# Patient Record
Sex: FEMALE | Race: WHITE | Employment: OTHER | URBAN - METROPOLITAN AREA
[De-identification: names, ages, dates, MRNs, and addresses within clinical notes are randomized per-mention and may not be internally consistent; named-entity substitution may affect disease eponyms.]

---
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
## 2025-01-09 ENCOUNTER — VIRTUAL VISIT (OUTPATIENT)
Dept: PSYCHIATRY | Facility: CLINIC | Age: 20
End: 2025-01-09
Payer: COMMERCIAL

## 2025-01-09 VITALS — WEIGHT: 150 LBS | BODY MASS INDEX: 21.47 KG/M2 | HEIGHT: 70 IN

## 2025-01-09 DIAGNOSIS — F33.1 MODERATE EPISODE OF RECURRENT MAJOR DEPRESSIVE DISORDER (H): ICD-10-CM

## 2025-01-09 DIAGNOSIS — F90.0 ADHD (ATTENTION DEFICIT HYPERACTIVITY DISORDER), INATTENTIVE TYPE: Primary | ICD-10-CM

## 2025-01-09 DIAGNOSIS — F40.10 SOCIAL ANXIETY DISORDER: ICD-10-CM

## 2025-01-09 RX ORDER — VENLAFAXINE HYDROCHLORIDE 37.5 MG/1
CAPSULE, EXTENDED RELEASE ORAL
Qty: 67 CAPSULE | Refills: 0 | Status: SHIPPED | OUTPATIENT
Start: 2025-01-09 | End: 2025-02-15

## 2025-01-09 RX ORDER — LISDEXAMFETAMINE DIMESYLATE 50 MG/1
50 CAPSULE ORAL EVERY MORNING
Qty: 30 CAPSULE | Refills: 0 | Status: SHIPPED | OUTPATIENT
Start: 2025-01-09

## 2025-01-09 ASSESSMENT — PATIENT HEALTH QUESTIONNAIRE - PHQ9: SUM OF ALL RESPONSES TO PHQ QUESTIONS 1-9: 14

## 2025-01-09 NOTE — NURSING NOTE
Current patient location: [address] [place] [id number]    Is the patient currently in the state of [place]? YES    Visit mode: VIDEO    If the visit is dropped, the patient can be reconnected by:VIDEO VISIT: Text to cell phone:   Telephone Information:   Mobile [phone]       Will anyone else be joining the visit? NO  (If patient encounters technical issues they should call [phone]    Are changes needed to the allergy or medication list? No    Are refills needed on medications prescribed by this physician? YES    Rooming Documentation:  Questionnaire(s) completed    Reason for visit: RECHECK    Deana LOPEZ

## 2025-01-09 NOTE — PROGRESS NOTES
"Virtual Visit Details    Type of service:  Video Visit     Originating Location (pt. Location): Home    Distant Location (provider location):  Off-site  Platform used for Video Visit: [facility]    PSYCHIATRY CLINIC PROGRESS NOTE    30 minute medication management   IDENTIFICATION: Jerica Fontana is a 19 year old female with previous psychiatric diagnoses of major depressive disorder, without prior episode, current, moderate, social anxiety disorder and ADHD, inattentive type. Pt presents for ongoing psychiatric follow-up and was seen for initial diagnostic evaluation on 2019.    SUBJECTIVE / INTERIM HISTORY     The pt was last seen in clinic 24 at which time no medication changes were made. The patient reports poor medication adherence. Since the last visit, she has been off medications for about 1 week due to forgetting to have prescriptions transferred. She denies discontinuation effects.      SYMPTOMS include a lowering of mood. She reports that she is feeling more down, apathetic toward life, lower motivation and tired. She describes thoughts like \"it'd be cool if I \" but denies plan or intent. She also denies SIB/HI or any other known safety concerns. She continues to struggle with daily tasks, forgetfulness, and focus.     Current Substance Use- denies. Sober support- na     MEDICAL ROS          Reports A comprehensive review of systems was performed and is negative other than noted above.    PAST MEDICATION TRIALS    Concerta up to 72 mg first trialed 20-3/24/21 was effective, but caused dry eyes. Restarted 21, was effective for under a year at 72 mg but was switched to vyvanse on 2022 due to reduced efficacy.  Adderall XR started 3/24/21 and titrated up to 30 mg on 21. Switched back to Concerta on 21 as it was not as effective.  Lexapro started 19 and titrated to 20 mg on 19 and currently moderately effective for anxiety, but minimally effective for " depression.  Wellbutrin  mg started 12/23/20 and currently moderately effective for depression with Lexapro 20 mg, attempted taper July 2023 but restarted due to increase in anxiety  Hydroxyzine 10 mg TID PRN for anxiety started 12/23/19 and currently not using often.  MEDICAL HISTORY      Primary Care Physician: Clinic, [facility] at [address]  [facility] [place] [facility] [id number]     Neurologic Hx:  head injury- none     seizure- none      LOC- none    other- na   Patient Active Problem List   Diagnosis    Distal radius fracture     ALLERGY     No Known Allergies    MEDICATIONS      Current Outpatient Medications   Medication Sig Dispense Refill    buPROPion (WELLBUTRIN XL) 300 MG 24 hr tablet Take 1 tablet (300 mg) by mouth every morning. 30 tablet 1    escitalopram (LEXAPRO) 20 MG tablet Take 1 tablet (20 mg) by mouth daily. 30 tablet 1    estradiol (VIVELLE-DOT) 0.025 MG/24HR bi-weekly patch Place 1 patch onto the skin twice a week      hydrOXYzine (ATARAX) 10 MG tablet Take 1 tablet (10 mg) by mouth 3 times daily as needed for anxiety 90 tablet 0    lisdexamfetamine (VYVANSE) 50 MG capsule Take 1 capsule (50 mg) by mouth every morning. 30 capsule 0    VITAMIN D PO Take by mouth daily       No current facility-administered medications for this visit.       Drug Interaction Check is remarkable for:  Plasma concentrations and pharmacologic effects of Amphetamines may be increased by strong CYP2D6 inhibitors (eg, Strong CYP2D6 Inhibitors).   Amphetamines may enhance the serotonergic effect of Serotonergic Agents (High Risk). This could result in serotonin syndrome.   Delirium may occur when Selective Serotonin Reuptake Inhibitors and Agents with Clinically Relevant Anticholinergic Effects are coadministered.   Has tolerated thus far but pt aware of interactions and side effects to monitor for  VITALS    There were no vitals taken for this visit.  LABS  use PSYCHLAB______        none    MENTAL STATUS EXAM     Alertness: alert  and oriented  Appearance: casually groomed  Behavior/Demeanor: cooperative, with fair  eye contact  Speech: normal  Language: no problems  Psychomotor: restless and fidgety  Mood:  depressed  Affect: blunted; was congruent to mood; was congruent to content  Thought Process/Associations: unremarkable  Thought Content: reports suicidal ideation without plan; without intent [details in Interim History]  Perception: denies auditory hallucinations and visual hallucinations  Insight: fair  Judgment: fair  Cognition: does appear grossly intact; formal cognitive testing was not done     PSYCHOLOGICAL TESTING:     none    ASSESSMENT     Jerica Fontana is a 19 year old female with psychiatric diagnoses of major depressive disorder, recurrent, moderate, social anxiety disorder and ADHD, inattentive type. Patient was engaged and cooperative throughout the visit. She has been off all medication for about a week and has noticed a lowering of mood. However, in hindsight, she feels that her previous medications never really helped mood as much as they could have and she is interested in trying something else today, specifically something other than another selective serotonin reuptake inhibitor. Will start effexor today. Follow-up planned for 1 month. Will also reorder pt's vyvanse. Pt does feel vyvanse is helpful but also notes that this feels like it could be doing more as well. Pt to reach out in about 2 weeks if she wants to try a higher dose of vyvanse for school and will adjust prior to next appointment. Pt will check BP prior to next appointment.        The longitudinal plan of care for major depressive disorder, without prior episode, current, moderate, social anxiety disorder and ADHD, inattentive type  were addressed during this visit. Due to the added complexity in care, I will continue to support Jerica in the subsequent management of this condition(s) and with the  ongoing continuity of care of this condition(s).      6}       TREATMENT RISK STATEMENT:  The risks, benefits, alternatives and potential adverse effects have been explained and are understood by the pt and pt's parent(s)/guardian.  Discussion of specific concerns included- N/A. The  pt and pt's parent(s)/guardian agrees to the treatment plan with the ability to do so. The  pt and pt's parent(s)/guardian knows to call the clinic for any problems or access emergency care if needed. There are no medical considerations relevant to treatment, as noted above. Substance use is not a problem as noted above.      Drug interaction check was done for any med changes and is discussed above.      DIAGNOSES                                                                                                      Encounter Diagnoses   Name Primary?    ADHD (attention deficit hyperactivity disorder), inattentive type Yes    Social anxiety disorder                                  PLAN                                                                                                 Medication Plan:         -- pt has stopped wellbutrin and lexapro        -- start effexor XR 37.5 mg PO Q Day for 1 week then increase to 75 mg daily   Sent       -- continue vyvanse 50 mg PO Q Day   Sent     Labs:  none    Pt monitor [call for probs]: blood pressure     THERAPY: No Change    REFERRALS [CD, medical, other]:  none    :  none    Controlled Substance Contract was not completed    RTC: 4-6 weeks    CRISIS NUMBERS: Provided in AVS upon request of patient/guardian.

## 2025-02-05 DIAGNOSIS — F33.1 MODERATE EPISODE OF RECURRENT MAJOR DEPRESSIVE DISORDER (H): Primary | ICD-10-CM

## 2025-02-05 RX ORDER — VENLAFAXINE HYDROCHLORIDE 75 MG/1
75 CAPSULE, EXTENDED RELEASE ORAL DAILY
Qty: 30 CAPSULE | Refills: 0 | Status: SHIPPED | OUTPATIENT
Start: 2025-02-05

## 2025-02-05 NOTE — TELEPHONE ENCOUNTER
Last seen: 1/09/2025  RTC: 4-6 weeks  Cancel: 0  No-show: 0  Next appt: 2/17/2025    Incoming refill from pharmacy e-prescribe    Medication requested:   Pending Prescriptions:                       Disp   Refills    venlafaxine (EFFEXOR XR) 75 MG 24 hr caps*30 cap*             Sig: TAKE ONE CAPSULE BY MOUTH DAILY(AFTER DONE WITH           37.5MG TAPER)        Last refill (dispense history or ):         From chart note:        -- start effexor XR 37.5 mg PO Q Day for 1 week then increase to 75 mg daily                 Sent       Medication refill approved per refill protocol.

## 2025-02-08 ENCOUNTER — MYC REFILL (OUTPATIENT)
Dept: PSYCHIATRY | Facility: CLINIC | Age: 20
End: 2025-02-08
Payer: COMMERCIAL

## 2025-02-08 DIAGNOSIS — F90.0 ADHD (ATTENTION DEFICIT HYPERACTIVITY DISORDER), INATTENTIVE TYPE: ICD-10-CM

## 2025-02-10 RX ORDER — LISDEXAMFETAMINE DIMESYLATE 50 MG/1
50 CAPSULE ORAL EVERY MORNING
Qty: 30 CAPSULE | Refills: 0 | Status: SHIPPED | OUTPATIENT
Start: 2025-02-10

## 2025-02-10 NOTE — TELEPHONE ENCOUNTER
Last seen: 1/9/2025  RTC: 4-6 weeks  Cancel: 0  No-show: 0  Next appt: 2/17/2025    Incoming refill from patient via [facility]    Medication requested:   Pending Prescriptions:                       Disp   Refills    lisdexamfetamine (VYVANSE) 50 MG capsule  30 cap*0            Sig: Take 1 capsule (50 mg) by mouth every morning.        Last refill (dispense history or ):         From chart note:        -- continue vyvanse 50 mg PO Q Day                 Sent           Medication unable to be refilled by RN due to criteria not met as indicated.                 []Eligibility - not seen in the last year              []Supervision - no future appointment              []Compliance - no shows, cancellations or lapse in therapy              []Verification - order discrepancy              [x]Controlled medication              []Medication not included in policy              []90-day supply request              []Other:

## 2025-02-17 ENCOUNTER — VIRTUAL VISIT (OUTPATIENT)
Dept: PSYCHIATRY | Facility: CLINIC | Age: 20
End: 2025-02-17
Payer: COMMERCIAL

## 2025-02-17 VITALS — BODY MASS INDEX: 21.47 KG/M2 | WEIGHT: 150 LBS | HEIGHT: 70 IN

## 2025-02-17 DIAGNOSIS — F90.0 ADHD (ATTENTION DEFICIT HYPERACTIVITY DISORDER), INATTENTIVE TYPE: ICD-10-CM

## 2025-02-17 DIAGNOSIS — F40.10 SOCIAL ANXIETY DISORDER: ICD-10-CM

## 2025-02-17 DIAGNOSIS — F33.1 MODERATE EPISODE OF RECURRENT MAJOR DEPRESSIVE DISORDER (H): ICD-10-CM

## 2025-02-17 RX ORDER — VENLAFAXINE HYDROCHLORIDE 75 MG/1
75 CAPSULE, EXTENDED RELEASE ORAL DAILY
Qty: 30 CAPSULE | Refills: 0 | Status: SHIPPED | OUTPATIENT
Start: 2025-02-17

## 2025-02-17 RX ORDER — LISDEXAMFETAMINE DIMESYLATE 50 MG/1
50 CAPSULE ORAL EVERY MORNING
Qty: 30 CAPSULE | Refills: 0 | Status: SHIPPED | OUTPATIENT
Start: 2025-03-10

## 2025-02-17 RX ORDER — HYDROXYZINE HYDROCHLORIDE 10 MG/1
10 TABLET, FILM COATED ORAL 3 TIMES DAILY PRN
Qty: 90 TABLET | Refills: 0 | Status: SHIPPED | OUTPATIENT
Start: 2025-02-17

## 2025-02-17 ASSESSMENT — PAIN SCALES - GENERAL: PAINLEVEL_OUTOF10: NO PAIN (0)

## 2025-02-17 NOTE — NURSING NOTE
Current patient location: [address] [place] [id number]    Is the patient currently in the state of [place]? YES    Visit mode:VIDEO    If the visit is dropped, the patient can be reconnected by: VIDEO VISIT: Text to cell phone:   Telephone Information:   Mobile [phone]       Will anyone else be joining the visit? NO  (If patient encounters technical issues they should call [phone])    How would you like to obtain your AVS? MyChart    Are changes needed to the allergy or medication list? Pt stated no changes to allergies and Pt stated no med changes    Are refills needed on medications prescribed by this physician? NO    Rooming Documentation:  Questionnaire(s) completed    Reason for visit: RECHECK     Nery LOPEZ

## 2025-02-17 NOTE — PROGRESS NOTES
"Virtual Visit Details    Type of service:  Video Visit     Originating Location (pt. Location): Home    Distant Location (provider location):  Off-site  Platform used for Video Visit: [facility]  PSYCHIATRY CLINIC PROGRESS NOTE    30 minute medication management   IDENTIFICATION: Jerica Fontana is a 19 year old female with previous psychiatric diagnoses of major depressive disorder, without prior episode, current, moderate, social anxiety disorder and ADHD, inattentive type. Pt presents for ongoing psychiatric follow-up and was seen for initial diagnostic evaluation on 8/22/2019.    SUBJECTIVE / INTERIM HISTORY     The pt was last seen in clinic 1/9/25 at which time pt had stopped all medications and plan was made to restart vyvanse and switch to effexor for antidepressant. The patient reports good medication adherence. Since the last visit, she has taken her medication daily as prescribed. She denies any side effects. However, later reports low appetite. Plan made to add more structure to her day so she does not forget to eat.     SYMPTOMS include some improvement in mood and motivation. She describes her mood as \"generally pretty good\".  However, she does note an increase in anxiety the past two days but in talking more wonders if this is related to low blood sugar due to not eating enough the past two days. She denies panic, SI/HI/SIB or any other known safety concerns.     Current Substance Use- nicotine continues, averaging 1 per day, THC about twice per week, this is less overall, alcohol about twice per week. . Sober support- na     MEDICAL ROS          Reports A comprehensive review of systems was performed and is negative other than noted above..     PAST MEDICATION TRIALS    Concerta up to 72 mg first trialed 7/23/20-3/24/21 was effective, but caused dry eyes. Restarted 5/5/21, was effective for under a year at 72 mg but was switched to vyvanse on 2/7/2022 due to reduced efficacy.  Adderall XR started 3/24/21 " "and titrated up to 30 mg on 4/2/21. Switched back to Concerta on 5/5/21 as it was not as effective.  Lexapro started 8/22/19 and titrated to 20 mg on 12/23/19 and currently moderately effective for anxiety, but minimally effective for depression, stopped by pt in January 2025   Wellbutrin  mg started 12/23/20 and currently moderately effective for depression with Lexapro 20 mg, attempted taper July 2023 but restarted due to increase in anxiety, stopped by pt in January 2025   Hydroxyzine 10 mg TID PRN for anxiety started 12/23/19 and currently not using often.  MEDICAL HISTORY      Primary Care Physician: Clinic, [facility] at [address]  [facility] [id number]     Neurologic Hx:  head injury- none     seizure- none      LOC- none    other- na   Patient Active Problem List   Diagnosis    Distal radius fracture     ALLERGY     No Known Allergies    MEDICATIONS      Current Outpatient Medications   Medication Sig Dispense Refill    hydrOXYzine HCl (ATARAX) 10 MG tablet Take 1 tablet (10 mg) by mouth 3 times daily as needed for anxiety. 90 tablet 0    [START ON 3/10/2025] lisdexamfetamine (VYVANSE) 50 MG capsule Take 1 capsule (50 mg) by mouth every morning. 30 capsule 0    venlafaxine (EFFEXOR XR) 75 MG 24 hr capsule Take 1 capsule (75 mg) by mouth daily. 30 capsule 0    estradiol (VIVELLE-DOT) 0.025 MG/24HR bi-weekly patch Place 1 patch onto the skin twice a week      VITAMIN D PO Take by mouth daily       No current facility-administered medications for this visit.       Drug Interaction Check is remarkable for:  Amphetamines may enhance the serotonergic effect of Serotonergic Agents (High Risk). This could result in serotonin syndrome.   Has tolerated  VITALS    Ht 1.778 m (5' 10\")   Wt 68 kg (150 lb)   BMI 21.52 kg/m    LABS  use PSYCHLAB______       none    MENTAL STATUS EXAM     Alertness: alert  and oriented  Appearance: casually groomed  Behavior/Demeanor: cooperative, " "with fair  eye contact  Speech: normal  Language: no problems  Psychomotor: restless and fidgety  Mood:  \"pretty good actually\"  Affect: blunted; was congruent to mood; was congruent to content  Thought Process/Associations: unremarkable  Thought Content: denies suicidal ideation   Perception: denies auditory hallucinations and visual hallucinations  Insight: fair  Judgment: fair  Cognition: does appear grossly intact; formal cognitive testing was not done    PSYCHOLOGICAL TESTING:     none    ASSESSMENT     Jerica Fontana is a 19 year old female with psychiatric diagnoses of major depressive disorder, recurrent, moderate, social anxiety disorder and ADHD, inattentive type. Patient was engaged and cooperative throughout the visit. She is endorsing some moderated improvement in mood with switch to effexor. Reinforced a routine around eating and adding more protein today. No changes to medication plan at this time. Follow-up planned for 1 month. Pt to reach out sooner with any questions, concerns, or if an earlier appointment is needed.          I was present with the student Francheska Pink, who participated in the service and in the documentation of the note. I have verified the history and personally performed the psychiatric exam and medical decision-making. I agree with the assessment and plan of care as documented in the note.     The longitudinal plan of care for major depressive disorder, without prior episode, current, moderate, social anxiety disorder and ADHD, inattentive type  were addressed during this visit. Due to the added complexity in care, I will continue to support Jerica in the subsequent management of this condition(s) and with the ongoing continuity of care of this condition(s).      6}       TREATMENT RISK STATEMENT:  The risks, benefits, alternatives and potential adverse effects have been explained and are understood by the pt and pt's parent(s)/guardian.  Discussion of specific concerns " included- N/A. The  pt and pt's parent(s)/guardian agrees to the treatment plan with the ability to do so. The  pt and pt's parent(s)/guardian knows to call the clinic for any problems or access emergency care if needed. There are no medical considerations relevant to treatment, as noted above. Substance use is not a problem as noted above.      Drug interaction check was done for any med changes and is discussed above.      DIAGNOSES                                                                                                      Encounter Diagnoses   Name Primary?    Social anxiety disorder     Moderate episode of recurrent major depressive disorder (H)     ADHD (attention deficit hyperactivity disorder), inattentive type                                    PLAN                                                                                                 Medication Plan:         -- continue effexor 75 mg PO Q Day  sent        -- continue vyvanse 50 mg PO Q Day                 Sent       -- continue hydroxyzine 10 mg TID PRN   sent    Labs:  none    Pt monitor [call for probs]: nothing specific needed    THERAPY: re-engage in therapy    REFERRALS [CD, medical, other]:  none    :  none    Controlled Substance Contract was not completed    RTC: 1 month    CRISIS NUMBERS: Provided in AVS upon request of patient/guardian.

## 2025-03-10 ENCOUNTER — VIRTUAL VISIT (OUTPATIENT)
Dept: PSYCHIATRY | Facility: CLINIC | Age: 20
End: 2025-03-10
Payer: COMMERCIAL

## 2025-03-10 DIAGNOSIS — F33.1 MODERATE EPISODE OF RECURRENT MAJOR DEPRESSIVE DISORDER (H): ICD-10-CM

## 2025-03-10 DIAGNOSIS — F40.10 SOCIAL ANXIETY DISORDER: Primary | ICD-10-CM

## 2025-03-10 DIAGNOSIS — F90.0 ADHD (ATTENTION DEFICIT HYPERACTIVITY DISORDER), INATTENTIVE TYPE: ICD-10-CM

## 2025-03-10 RX ORDER — LISDEXAMFETAMINE DIMESYLATE 50 MG/1
50 CAPSULE ORAL EVERY MORNING
Qty: 30 CAPSULE | Refills: 0 | Status: SHIPPED | OUTPATIENT
Start: 2025-04-10

## 2025-03-10 RX ORDER — LISDEXAMFETAMINE DIMESYLATE 50 MG/1
50 CAPSULE ORAL EVERY MORNING
Qty: 30 CAPSULE | Refills: 0 | Status: SHIPPED | OUTPATIENT
Start: 2025-05-10

## 2025-03-10 RX ORDER — VENLAFAXINE HYDROCHLORIDE 75 MG/1
75 CAPSULE, EXTENDED RELEASE ORAL DAILY
Qty: 30 CAPSULE | Refills: 1 | Status: SHIPPED | OUTPATIENT
Start: 2025-03-10

## 2025-03-10 NOTE — PROGRESS NOTES
"Virtual Visit Details    Type of service:  Video Visit     Originating Location (pt. Location): Home    Distant Location (provider location):  Off-site  Platform used for Video Visit: [facility]    PSYCHIATRY CLINIC PROGRESS NOTE    30 minute medication management   IDENTIFICATION: Jerica Fontana is a 19 year old female with previous psychiatric diagnoses of major depressive disorder, without prior episode, current, moderate, social anxiety disorder and ADHD, inattentive type. Pt presents for ongoing psychiatric follow-up and was seen for initial diagnostic evaluation on 8/22/2019.    SUBJECTIVE / INTERIM HISTORY     The pt was last seen in clinic 2/17/25 at which time no medication changes were made but did reinforce eating more regularly throughout the day. The patient reports good medication adherence. Since the last visit, she has taken her medication daily as prescribed. She denies any know side effects of the medication. She has noticed since increasing intake, she is no longer having low blood sugar symptoms. She will likely start therapy soon with a therapist near her school.     SYMPTOMS include ongoing improvement in mood and anxiety. She states that she is feeling \"good\". She denies excessive worry. She has new friends she is spending time with at school. Focus is good and she is keeping up with her coursework. She denies SI/HI/SIB or any other known safety concerns.     Current Substance Use- no change. nicotine continues, averaging 1 cigarette per day, THC about twice per week, this is less overall, alcohol about twice per week. Sober support- na     MEDICAL ROS          Reports A comprehensive review of systems was performed and is negative other than noted above.     PAST MEDICATION TRIALS    Concerta up to 72 mg first trialed 7/23/20-3/24/21 was effective, but caused dry eyes. Restarted 5/5/21, was effective for under a year at 72 mg but was switched to vyvanse on 2/7/2022 due to reduced " efficacy.  Adderall XR started 3/24/21 and titrated up to 30 mg on 4/2/21. Switched back to Concerta on 5/5/21 as it was not as effective.  Lexapro started 8/22/19 and titrated to 20 mg on 12/23/19 and currently moderately effective for anxiety, but minimally effective for depression, stopped by pt in January 2025   Wellbutrin  mg started 12/23/20 and currently moderately effective for depression with Lexapro 20 mg, attempted taper July 2023 but restarted due to increase in anxiety, stopped by pt in January 2025   Hydroxyzine 10 mg TID PRN for anxiety started 12/23/19 and currently not using often.  MEDICAL HISTORY      Primary Care Physician: Clinic, [facility] at [address]  [facility] [address]     Neurologic Hx:  head injury- none     seizure- none      LOC- none    other- na   Patient Active Problem List   Diagnosis    Distal radius fracture     ALLERGY     No Known Allergies    MEDICATIONS      Current Outpatient Medications   Medication Sig Dispense Refill    estradiol (VIVELLE-DOT) 0.025 MG/24HR bi-weekly patch Place 1 patch onto the skin twice a week      hydrOXYzine HCl (ATARAX) 10 MG tablet Take 1 tablet (10 mg) by mouth 3 times daily as needed for anxiety. 90 tablet 0    lisdexamfetamine (VYVANSE) 50 MG capsule Take 1 capsule (50 mg) by mouth every morning. 30 capsule 0    venlafaxine (EFFEXOR XR) 75 MG 24 hr capsule Take 1 capsule (75 mg) by mouth daily. 30 capsule 0    VITAMIN D PO Take by mouth daily       No current facility-administered medications for this visit.       Drug Interaction Check is remarkable for:  Amphetamines may enhance the serotonergic effect of Serotonergic Agents (High Risk). This could result in serotonin syndrome.   Has tolerated  VITALS    There were no vitals taken for this visit.  LABS  use PSYCHLAB______       none    MENTAL STATUS EXAM     Alertness: alert  and oriented  Appearance: casually groomed  Behavior/Demeanor: cooperative,  "with fair  eye contact  Speech: normal  Language: no problems  Psychomotor: restless and fidgety  Mood:  \"good\"  Affect: appropriate; was congruent to mood; was congruent to content  Thought Process/Associations: unremarkable  Thought Content: denies suicidal ideation   Perception: denies auditory hallucinations and visual hallucinations  Insight: fair  Judgment: fair  Cognition: does appear grossly intact; formal cognitive testing was not done    PSYCHOLOGICAL TESTING:     none    ASSESSMENT     Jerica Fontana is a 19 year old female with psychiatric diagnoses of  major depressive disorder, recurrent, moderate, social anxiety disorder and ADHD, inattentive type. Patient was engaged and cooperative throughout the visit. She thinks her mood and focus are good and would like to keep medications the same today. Follow-up planned for 3 months. Pt to reach out sooner with any questions, concerns, or if an earlier appointment is needed.         The longitudinal plan of care for major depressive disorder, without prior episode, current, moderate, social anxiety disorder and ADHD, inattentive type  were addressed during this visit. Due to the added complexity in care, I will continue to support Jerica in the subsequent management of this condition(s) and with the ongoing continuity of care of this condition(s).      6}       TREATMENT RISK STATEMENT:  The risks, benefits, alternatives and potential adverse effects have been explained and are understood by the pt and pt's parent(s)/guardian.  Discussion of specific concerns included- N/A. The  pt and pt's parent(s)/guardian agrees to the treatment plan with the ability to do so. The  pt and pt's parent(s)/guardian knows to call the clinic for any problems or access emergency care if needed. There are no medical considerations relevant to treatment, as noted above. Substance use is not a problem as noted above.      Drug interaction check was done for any med changes and is " discussed above.      DIAGNOSES                                                                                                      Encounter Diagnoses   Name Primary?    Social anxiety disorder Yes    Moderate episode of recurrent major depressive disorder (H)     ADHD (attention deficit hyperactivity disorder), inattentive type                                  PLAN                                                                                                 Medication Plan:         -- continue effexor 75 mg PO Q Day  sent        -- continue vyvanse 50 mg PO Q Day                 Should have march on file still, sent April/may       -- continue hydroxyzine 10 mg TID PRN                 Pt taking sparingly, not needed today    Labs:  none    Pt monitor [call for probs]: nothing specific needed    THERAPY: No Change    REFERRALS [CD, medical, other]:  none    :  none    Controlled Substance Contract was not completed    RTC: 3 months    CRISIS NUMBERS: Provided in AVS upon request of patient/guardian.

## 2025-03-10 NOTE — PATIENT INSTRUCTIONS
**For crisis resources, please see the information at the end of this document**   Patient Education    Thank you for coming to the [facility].     Lab Testing:  If you had lab testing today and your results are reassuring or normal they will be mailed to you or sent through [employer] within 7 days. If the lab tests need quick action we will call you with the results. The phone number we will call with results is # [phone]. If this is not the best number please call our clinic and change the number.     Medication Refills:  If you need any refills please call your pharmacy and they will contact us. Our fax number for refills is [phone].   Three business days of notice are needed for general medication refill requests.   Five business days of notice are needed for controlled substance refill requests.   If you need to change to a different pharmacy, please contact the new pharmacy directly. The new pharmacy will help you get your medications transferred.     Contact Us:  Please call [phone] during business hours (8-5:00 M-F).   If you have medication related questions after clinic hours, or on the weekend, please call [phone].     Financial Assistance [phone]   Medical Records [phone]       MENTAL HEALTH CRISIS RESOURCES:  For a emergency help, please call 911 or go to the nearest Emergency Department.     Emergency Walk-In Options:   EmPATH Unit @ [facility] ([place]): [phone] - Specialized mental health emergency area designed to be calming  [facility] ([place]): [phone]  [facility] ([place]): [phone]  [facility]): [phone]    [facility] Crisis Information:   [place]: [phone]  [place]: [phone]  [place] ([place]) - Adult: [phone]     Child: [phone]  [place] - Adult: [phone]     Child: [phone]  [place]: [phone]  List of all [place]  [facility] resources:   https://[place].gov/dhs/people-we-serve/adults/health-care/mental-health/resources/crisis-contacts.jsp    National Crisis Information:   Crisis Text Line: Text  [place]  to 030120  Suicide & Crisis Lifeline: 988  National Suicide Prevention Lifeline: 0-036-635-TALK (3-903-791-5364)       For online chat options, visit https://suicidepreventionlifeline.org/chat/  Poison Control Center: 2-361-179-1609  Trans Lifeline: 2-875-250-5425 - Hotline for transgender people of all ages  [employer]: 6-173-630-6535 - Hotline for LGBT youth     For Non-Emergency Support:   Fast Tracker: Mental Health & Substance Use Disorder Resources -   https://www.[employer].org/

## 2025-03-16 ENCOUNTER — MYC MEDICAL ADVICE (OUTPATIENT)
Dept: PSYCHIATRY | Facility: CLINIC | Age: 20
End: 2025-03-16
Payer: COMMERCIAL

## 2025-03-16 DIAGNOSIS — F90.0 ADHD (ATTENTION DEFICIT HYPERACTIVITY DISORDER), INATTENTIVE TYPE: ICD-10-CM

## 2025-03-17 RX ORDER — LISDEXAMFETAMINE DIMESYLATE 50 MG/1
50 CAPSULE ORAL EVERY MORNING
Qty: 30 CAPSULE | Refills: 0 | Status: SHIPPED | OUTPATIENT
Start: 2025-03-17

## 2025-03-17 NOTE — TELEPHONE ENCOUNTER
"Pharmacy Medication \"Transfer\" Request    Medication:    Disp Refills Start End MINDY   lisdexamfetamine (VYVANSE) 50 MG capsule 30 capsule 0 3/10/2025 -- No   Sig - Route: Take 1 capsule (50 mg) by mouth every morning. - Oral   Sent to pharmacy as: Lisdexamfetamine Dimesylate 50 MG Oral Capsule (VYVANSE)   Class: E-Prescribe   Earliest Fill Date: 3/7/2025   Order: [id number]   E-Prescribing Status: Receipt confirmed by pharmacy (2/17/2025  4:23 PM CST)       Old Pharmacy:   [facility]ACY #[id number] - [place], [place] - [address] Pharm[facility]:   [address] in [place]     Last Refill:     "

## 2025-03-22 DIAGNOSIS — F40.10 SOCIAL ANXIETY DISORDER: ICD-10-CM

## 2025-03-24 RX ORDER — HYDROXYZINE HYDROCHLORIDE 10 MG/1
10 TABLET, FILM COATED ORAL 3 TIMES DAILY PRN
Qty: 90 TABLET | Refills: 0 | OUTPATIENT
Start: 2025-03-24

## 2025-03-24 NOTE — TELEPHONE ENCOUNTER
Last seen: 3/10/2025  RTC: 3 months  Any patient initiated cancellations or no shows since last visit? No  Next appt: 6/10/2025  Last filled:        Incoming refill from pharmacy via e-prescribe    Is note signed/closed? Yes    Is this a 90 day request for a psych medication? No    From chart note:        -- continue hydroxyzine 10 mg TID PRN                 Pt taking sparingly, not needed today    Medication unable to be refilled by RN due to criteria not met as indicated.                 []Eligibility - not seen in the last year              []Supervision - no future appointment              []Compliance - no shows, cancellations or lapse in therapy              []Verification - order discrepancy              []Controlled medication              []Medication not included in policy              []90-day supply request              [x]Other: Automated request from pharmacy, patient will contact clinic if in need of a refill.

## 2025-03-26 ENCOUNTER — VIRTUAL VISIT (OUTPATIENT)
Dept: PSYCHIATRY | Facility: CLINIC | Age: 20
End: 2025-03-26
Payer: COMMERCIAL

## 2025-03-26 VITALS — BODY MASS INDEX: 21.47 KG/M2 | WEIGHT: 150 LBS | HEIGHT: 70 IN

## 2025-03-26 DIAGNOSIS — F33.1 MODERATE EPISODE OF RECURRENT MAJOR DEPRESSIVE DISORDER (H): Primary | ICD-10-CM

## 2025-03-26 RX ORDER — DULOXETIN HYDROCHLORIDE 20 MG/1
CAPSULE, DELAYED RELEASE ORAL
Qty: 49 CAPSULE | Refills: 0 | Status: SHIPPED | OUTPATIENT
Start: 2025-03-26 | End: 2025-04-23

## 2025-03-26 ASSESSMENT — PAIN SCALES - GENERAL: PAINLEVEL_OUTOF10: NO PAIN (0)

## 2025-03-26 NOTE — PROGRESS NOTES
Virtual Visit Details    Type of service:  Video Visit     Originating Location (pt. Location): Home    Distant Location (provider location):  Off-site  Platform used for Video Visit: [facility]  PSYCHIATRY CLINIC PROGRESS NOTE    30 minute medication management   IDENTIFICATION: Jerica Fontana is a 19 year old female with previous psychiatric diagnoses of major depressive disorder, without prior episode, current, moderate, social anxiety disorder and ADHD, inattentive type. Pt presents for ongoing psychiatric follow-up and was seen for initial diagnostic evaluation on 8/22/2019.    SUBJECTIVE / INTERIM HISTORY     The pt was last seen in clinic 3/1025 at which time no medication changes were made. The patient reports uncertain medication adherence. Since the last visit, she has stopped taking effexor about 1 week ago after realizing that it was causing stomach upset. Since stopping the effexor, this has resolved. She has taken vyvanse most days. She is starting therapy soon.    SYMPTOMS include feeling more emotional since stopping effexor. She is more easily overwhelmed and tearful. She states that she did find effexor helpful for mood but the stomach upset was not tolerable. She denies SI/HI/SIB or any other known safety concerns.    Current Substance Use- THC and nicotine are unchanged from last visit, alcohol use had increased but she has now decided to quit drinking for now as she thinks it was how she was coping without effexor. Sober support- friends/therapy     MEDICAL ROS          Reports A comprehensive review of systems was performed and is negative other than noted above..     PAST MEDICATION TRIALS    Concerta up to 72 mg first trialed 7/23/20-3/24/21 was effective, but caused dry eyes. Restarted 5/5/21, was effective for under a year at 72 mg but was switched to vyvanse on 2/7/2022 due to reduced efficacy.  Adderall XR started 3/24/21 and titrated up to 30 mg on 4/2/21. Switched back to Concerta on  "5/5/21 as it was not as effective.  Lexapro started 8/22/19 and titrated to 20 mg on 12/23/19 and currently moderately effective for anxiety, but minimally effective for depression, stopped by pt in January 2025   Wellbutrin  mg started 12/23/20 and currently moderately effective for depression with Lexapro 20 mg, attempted taper July 2023 but restarted due to increase in anxiety, stopped by pt in January 2025   Hydroxyzine 10 mg TID PRN for anxiety started 12/23/19 and currently not using often.  MEDICAL HISTORY      Primary Care Physician: Clinic, [facility] at [address]  [facility] [address]     Neurologic Hx:  head injury- none     seizure- none      LOC- none    other- na   Patient Active Problem List   Diagnosis    Distal radius fracture     ALLERGY     No Known Allergies    MEDICATIONS      Current Outpatient Medications   Medication Sig Dispense Refill    estradiol (VIVELLE-DOT) 0.025 MG/24HR bi-weekly patch Place 1 patch onto the skin twice a week      hydrOXYzine HCl (ATARAX) 10 MG tablet Take 1 tablet (10 mg) by mouth 3 times daily as needed for anxiety. 90 tablet 0    lisdexamfetamine (VYVANSE) 50 MG capsule Take 1 capsule (50 mg) by mouth every morning. 30 capsule 0    [START ON 4/10/2025] lisdexamfetamine (VYVANSE) 50 MG capsule Take 1 capsule (50 mg) by mouth every morning. 30 capsule 0    [START ON 5/10/2025] lisdexamfetamine (VYVANSE) 50 MG capsule Take 1 capsule (50 mg) by mouth every morning. 30 capsule 0    venlafaxine (EFFEXOR XR) 75 MG 24 hr capsule Take 1 capsule (75 mg) by mouth daily. 30 capsule 1    VITAMIN D PO Take by mouth daily       No current facility-administered medications for this visit.       Drug Interaction Check is remarkable for:  Amphetamines may enhance the serotonergic effect of Serotonergic Agents (High Risk). This could result in serotonin syndrome.   Has tolerated  VITALS    Ht 1.778 m (5' 10\")   Wt 68 kg (150 lb)   BMI 21.52 " "kg/m    LABS  use PSYCHLAB______       none    MENTAL STATUS EXAM     Alertness: alert  and oriented  Appearance: casually groomed  Behavior/Demeanor: cooperative, with fair  eye contact  Speech: normal  Language: no problems  Psychomotor: restless and fidgety  Mood:  \"more emotional\"  Affect: appropriate; was congruent to mood; was congruent to content  Thought Process/Associations: unremarkable  Thought Content: denies suicidal ideation   Perception: denies auditory hallucinations and visual hallucinations  Insight: fair  Judgment: fair  Cognition: does appear grossly intact; formal cognitive testing was not done    PSYCHOLOGICAL TESTING:     none    ASSESSMENT     Jerica Fontana is a 19 year old female with psychiatric diagnoses of major depressive disorder, recurrent, moderate, social anxiety disorder and ADHD, inattentive type. Patient was engaged and cooperative throughout the visit. She did feel effexor was really helpful for mood but she could not tolerate the side effects. Will try cymbalta instead and will titrate more slowly given previous stomach upset. Follow-up planned for 1 month. Jerica to reach out sooner with any questions, concerns, or if an earlier appointment is needed.     The longitudinal plan of care for major depressive disorder, without prior episode, current, moderate, social anxiety disorder and ADHD, inattentive type  were addressed during this visit. Due to the added complexity in care, I will continue to support Jerica in the subsequent management of this condition(s) and with the ongoing continuity of care of this condition(s).      6}       TREATMENT RISK STATEMENT:  The risks, benefits, alternatives and potential adverse effects have been explained and are understood by the pt and pt's parent(s)/guardian.  Discussion of specific concerns included- N/A. The  pt and pt's parent(s)/guardian agrees to the treatment plan with the ability to do so. The  pt and pt's parent(s)/guardian knows " to call the clinic for any problems or access emergency care if needed. There are no medical considerations relevant to treatment, as noted above. Substance use is not a problem as noted above.      Drug interaction check was done for any med changes and is discussed above.      DIAGNOSES                                                                                                    No diagnosis found.                                PLAN                                                                                                 Medication Plan:         -- pt stopped effexor        -- start cymbalta 20 mg PO Q Day for 7 days then increase to 40 mg daily   Sent       -- continue vyvanse 50 mg PO Q Day                 Should have prescriptions on file       -- continue hydroxyzine 10 mg TID PRN                 not requested today    Labs:  none    Pt monitor [call for probs]: nothing specific needed    THERAPY: No Change    REFERRALS [CD, medical, other]:  none    :  none    Controlled Substance Contract was not completed    RTC: 1 month    CRISIS NUMBERS: Provided in AVS upon request of patient/guardian.

## 2025-03-26 NOTE — NURSING NOTE
History     Chief Complaint   Patient presents with     Otalgia     HPI    Tony Sinclair is a 6 year old male who presents with right ear pain and fullness for 2 day(s).   Severity: mild   Additional symptoms include patient was supposed to be finished with keflex yesterday for strep throat treatment, but has two pills left since he missed a couple of doses per mother. Patient was improving with throat symptoms and has no sore throat currently, but still having slight runny nose, congestion and now right sided ear pain that started as an on and off issue and is not persistent.       Problem list, Medication list, Allergies, and Medical/Social/Surgical histories reviewed in [facility] and updated as appropriate.    Allergies:  Allergies   Allergen Reactions     Amoxicillin Rash     Patient treated with Amoxicillin for otitis media.  Reaction to medication noted.        Problem List:    Patient Active Problem List    Diagnosis Date Noted     Poor sleep hygiene 01/18/2016     Priority: Medium     Overweight 02/10/2015     Priority: Medium     Problem list name updated by automated process. Provider to review          Past Medical History:    Past Medical History:   Diagnosis Date     Single liveborn, born before admission to hospital 2013     Tracheomalacia 2013       Past Surgical History:    History reviewed. No pertinent surgical history.    Family History:    Family History   Problem Relation Age of Onset     Hypertension Maternal Grandfather      Breast Cancer Paternal Grandmother        Social History:  Marital Status:  Single [1]  Social History     Tobacco Use     Smoking status: Passive Smoke Exposure - Never Smoker     Smokeless tobacco: Never Used   Substance Use Topics     Alcohol use: None     Drug use: None        Medications:      azithromycin (ZITHROMAX) 200 MG/5ML suspension   cephalexin (KEFLEX) 125 MG/5ML SUSR   cetirizine (CETIRIZINE HCL CHILDRENS) 5 MG/5ML syrup   melatonin 5 MG SL tablet  Current patient location: [address] [place] [id number]    Is the patient currently in the state of [place]? YES    Visit mode: VIDEO    If the visit is dropped, the patient can be reconnected by:VIDEO VISIT: Text to cell phone:   Telephone Information:   Mobile [phone]       Will anyone else be joining the visit? NO  (If patient encounters technical issues they should call [phone]    Are changes needed to the allergy or medication list? No    Are refills needed on medications prescribed by this physician? NO    Rooming Documentation:  Questionnaire(s) completed    Reason for visit: RECHECK    Katharina MARTINEZF         Multiple Vitamin (MULTI VITAMIN PO)   polyethylene glycol (MIRALAX) powder         Review of Systems   Constitutional: Negative for fever.   HENT: Positive for congestion, ear pain and rhinorrhea. Negative for sore throat.    All other systems reviewed and are negative.      Physical Exam   Pulse: 93  Temp: 98.1  F (36.7  C)  Resp: 20  Weight: 33.8 kg (74 lb 9.6 oz)  SpO2: 99 %      Physical Exam     Pulse 93   Temp 98.1  F (36.7  C) (Oral)   Resp 20   Wt 33.8 kg (74 lb 9.6 oz)   SpO2 99%    Right TM is normal: no effusions, no erythema, and normal landmarks, bulging and erythematous     The Right auditory canal is normal and without drainage, edema or erythema  Left TM is normal: no effusions, no erythema, and normal landmarks  The Left auditory canal is normal and without drainage, edema or erythema  Oropharynx exam is normal: no lesions, erythema, adenopathy or exudate.  GENERAL: no acute distress  EYES: EOMI,  PERRL, conjunctiva clear  NECK: supple, non-tender to palpation, no adenopathy noted  RESP: lungs clear to auscultation - no rales, rhonchi or wheezes  SKIN: no suspicious lesions or rashes   CV: regular rates and rhythm, normal    No results found for this or any previous visit (from the past 24 hour(s)).      ED Course        Procedures              Critical Care time:  none               No results found for this or any previous visit (from the past 24 hour(s)).    Medications - No data to display    Assessments & Plan (with Medical Decision Making)     I have reviewed the nursing notes.    I have reviewed the findings, diagnosis, plan and need for follow up with the patient.   6-year-old male presents the urgent care with right ear pain started a couple days ago.  Patient recently was on Keflex for strep throat and has 2 pills left per mom.  Patient still having a slight runny nose and congestion, but no sore throat or fevers.  See exam findings above.  Patient placed on azithromycin 5-day course  for treatment of right otitis media.  Patient follow-up with primary care doctor in 2 weeks for recheck of ears.  Patient return sooner if symptoms worsen or change these were discussed with patient and given on discharge paperwork.  Patient use Tylenol, ibuprofen, nasal saline sprays or warm compress to the ear for symptom medic treatment.  Patient discharged in stable condition.       Medication List      Started    azithromycin 200 MG/5ML suspension  Commonly known as:  ZITHROMAX  Take 7.5 mLs (300 mg) by mouth daily for 1 day, THEN 4 mLs (160 mg) daily for 4 days.  Start taking on:  4/16/2019            Final diagnoses:   Acute suppurative otitis media of right ear without spontaneous rupture of tympanic membrane, recurrence not specified       4/16/2019   [facility] EMERGENCY DEPARTMENT     Rebeca Hoyt PA-C  04/16/19 1920

## 2025-04-09 ENCOUNTER — VIRTUAL VISIT (OUTPATIENT)
Dept: PSYCHIATRY | Facility: CLINIC | Age: 20
End: 2025-04-09
Payer: COMMERCIAL

## 2025-04-09 DIAGNOSIS — F40.10 SOCIAL ANXIETY DISORDER: ICD-10-CM

## 2025-04-09 DIAGNOSIS — F33.1 MODERATE EPISODE OF RECURRENT MAJOR DEPRESSIVE DISORDER (H): Primary | ICD-10-CM

## 2025-04-09 DIAGNOSIS — F90.0 ADHD (ATTENTION DEFICIT HYPERACTIVITY DISORDER), INATTENTIVE TYPE: ICD-10-CM

## 2025-04-09 RX ORDER — PROPRANOLOL HYDROCHLORIDE 10 MG/1
5-10 TABLET ORAL DAILY PRN
Qty: 30 TABLET | Refills: 0 | Status: SHIPPED | OUTPATIENT
Start: 2025-04-09

## 2025-04-09 RX ORDER — VENLAFAXINE HYDROCHLORIDE 37.5 MG/1
CAPSULE, EXTENDED RELEASE ORAL
Qty: 67 CAPSULE | Refills: 0 | Status: SHIPPED | OUTPATIENT
Start: 2025-04-09 | End: 2025-05-16

## 2025-04-09 NOTE — PROGRESS NOTES
"PSYCHIATRY CLINIC PROGRESS NOTE    30 minute medication management   IDENTIFICATION: Jerica Fontana is a 19 year old female with previous psychiatric diagnoses of major depressive disorder, without prior episode, current, moderate, social anxiety disorder and ADHD, inattentive type. Pt presents for ongoing psychiatric follow-up and was seen for initial diagnostic evaluation on 8/22/2019.   SUBJECTIVE / INTERIM HISTORY     The pt was last seen in clinic 3/26/25 at which time plan was made to switch to duloxetine from venlafaxine. The patient reports uncertain medication adherence. Since the last visit, she tried the cymbalta for a few days and stomach upset continued. She then tried without it while at her parents but stomach upset continued which she now thinks is related to anxiety and not her medication. She has started therapy and really likes her new therapist.    SYMPTOMS include continued break through anxiety with stomach upset. Jerica has now realized that the stomach upset is likely more related to anxiety than her medication. As some time has passed, she is now thinking she would prefer to go back to venlafaxine instead of trying to continue to ramp up cymbalta since the venlafaxine was otherwise helpful for mood. She has one specific class that she thinks is causing increased anxiety for which hydroxyzine is not helpful. She is also realizing more that while vvyanse is helping her start her work, she is getting easily distracted or overwhelmed \"fuzzy\" pretty quickly after starting. She denies SI/HI/SIB or any other known safety concerns.    Current Substance Use- no change.. Sober support- na     MEDICAL ROS          Reports A comprehensive review of systems was performed and is negative other than noted above..     PAST MEDICATION TRIALS    Concerta up to 72 mg first trialed 7/23/20-3/24/21 was effective, but caused dry eyes. Restarted 5/5/21, was effective for under a year at 72 mg but was switched to " vyvanse on 2/7/2022 due to reduced efficacy.  Adderall XR started 3/24/21 and titrated up to 30 mg on 4/2/21. Switched back to Concerta on 5/5/21 as it was not as effective.  Lexapro started 8/22/19 and titrated to 20 mg on 12/23/19 and currently moderately effective for anxiety, but minimally effective for depression, stopped by pt in January 2025   Wellbutrin  mg started 12/23/20 and currently moderately effective for depression with Lexapro 20 mg, attempted taper July 2023 but restarted due to increase in anxiety, stopped by pt in January 2025   Hydroxyzine 10 mg TID PRN for anxiety started 12/23/19 and currently not using often.  Effexor  MEDICAL HISTORY      Primary Care Physician: Clinic, [facility] at [address]  [facility] [place] [address]     Neurologic Hx:  head injury- none     seizure- none      LOC- none    other- na   Patient Active Problem List   Diagnosis    Distal radius fracture     ALLERGY     No Known Allergies    MEDICATIONS      Current Outpatient Medications   Medication Sig Dispense Refill    estradiol (VIVELLE-DOT) 0.025 MG/24HR bi-weekly patch Place 1 patch onto the skin twice a week      lisdexamfetamine (VYVANSE) 50 MG capsule Take 1 capsule (50 mg) by mouth every morning. 30 capsule 0    [START ON 4/10/2025] lisdexamfetamine (VYVANSE) 50 MG capsule Take 1 capsule (50 mg) by mouth every morning. 30 capsule 0    [START ON 5/10/2025] lisdexamfetamine (VYVANSE) 50 MG capsule Take 1 capsule (50 mg) by mouth every morning. 30 capsule 0    VITAMIN D PO Take by mouth daily       No current facility-administered medications for this visit.       Drug Interaction Check is remarkable for:  Amphetamines may enhance the serotonergic effect of Serotonergic Agents (High Risk). This could result in serotonin syndrome.   Previously tolerated  VITALS    There were no vitals taken for this visit.  LABS  use PSYCHLAB______       none    MENTAL STATUS EXAM     Alertness:  "alert  and oriented  Appearance: casually groomed  Behavior/Demeanor: cooperative, with fair  eye contact  Speech: normal  Language: no problems  Psychomotor: restless and fidgety  Mood:  \"up and down\"  Affect: appropriate; was congruent to mood; was congruent to content  Thought Process/Associations: unremarkable  Thought Content: denies suicidal ideation   Perception: denies auditory hallucinations and visual hallucinations  Insight: fair  Judgment: fair  Cognition: does appear grossly intact; formal cognitive testing was not done    PSYCHOLOGICAL TESTING:     none    ASSESSMENT     Jerica Fontana is a 19 year old female with psychiatric diagnoses of major depressive disorder, recurrent, moderate, social anxiety disorder and ADHD, inattentive type. Patient was engaged and cooperative throughout the visit. She has realized that stomach upset is linked to anxiety and would like to stop the ramp up of cymbalta and switch back to effexor because it was so helpful for mood. She does not think hydroxyzine is helpful for break through anxiety. She has already stopped taking cymbalta. Plan made to restart effexor 37.5 mg Po Q Day for 7 days then increase to 75 mg daily and switch hydroxyzine to propranolol PRN. Follow-up in 3 weeks. Jerica to reach out sooner with any questions, concerns, or if an earlier appointment is needed.       The longitudinal plan of care for major depressive disorder, without prior episode, current, moderate, social anxiety disorder and ADHD, inattentive type  were addressed during this visit. Due to the added complexity in care, I will continue to support Jerica in the subsequent management of this condition(s) and with the ongoing continuity of care of this condition(s).      6}       TREATMENT RISK STATEMENT:  The risks, benefits, alternatives and potential adverse effects have been explained and are understood by the pt and pt's parent(s)/guardian.  Discussion of specific concerns included- " N/A. The  pt and pt's parent(s)/guardian agrees to the treatment plan with the ability to do so. The  pt and pt's parent(s)/guardian knows to call the clinic for any problems or access emergency care if needed. There are no medical considerations relevant to treatment, as noted above. Substance use is not a problem as noted above.      Drug interaction check was done for any med changes and is discussed above.      DIAGNOSES                                                                                                      Encounter Diagnoses   Name Primary?    Moderate episode of recurrent major depressive disorder (H) Yes    Social anxiety disorder     ADHD (attention deficit hyperactivity disorder), inattentive type                                    PLAN                                                                                                 Medication Plan:         -- pt stopped cymbalta        -- restart effexor XR 37.5 mg Po Q Day for 7 days then increase to 75 mg daily   Sent       -- stop hydroxyzine       -- start propranolol 5-10 mg PO Q Day PRN   Sent       -- continue vyvanse 50 mg PO Q Day                 Should have prescriptions on file    Labs:  none    Pt monitor [call for probs]: nothing specific needed    THERAPY: No Change    REFERRALS [CD, medical, other]:  none    :  none    Controlled Substance Contract was not completed    RTC: 3 weeks    CRISIS NUMBERS: Provided in AVS upon request of patient/guardian.

## 2025-04-23 DIAGNOSIS — F40.10 SOCIAL ANXIETY DISORDER: ICD-10-CM

## 2025-04-23 RX ORDER — ESCITALOPRAM OXALATE 20 MG/1
20 TABLET ORAL DAILY
Qty: 30 TABLET | Refills: 1 | OUTPATIENT
Start: 2025-04-23

## 2025-04-29 ENCOUNTER — VIRTUAL VISIT (OUTPATIENT)
Dept: PSYCHIATRY | Facility: CLINIC | Age: 20
End: 2025-04-29
Payer: COMMERCIAL

## 2025-04-29 DIAGNOSIS — F90.0 ADHD (ATTENTION DEFICIT HYPERACTIVITY DISORDER), INATTENTIVE TYPE: ICD-10-CM

## 2025-04-29 DIAGNOSIS — F33.1 MODERATE EPISODE OF RECURRENT MAJOR DEPRESSIVE DISORDER (H): ICD-10-CM

## 2025-04-29 DIAGNOSIS — F40.10 SOCIAL ANXIETY DISORDER: ICD-10-CM

## 2025-04-29 RX ORDER — VENLAFAXINE HYDROCHLORIDE 75 MG/1
75 CAPSULE, EXTENDED RELEASE ORAL DAILY
Qty: 30 CAPSULE | Refills: 1 | Status: SHIPPED | OUTPATIENT
Start: 2025-04-29

## 2025-04-29 RX ORDER — LISDEXAMFETAMINE DIMESYLATE 60 MG/1
60 CAPSULE ORAL EVERY MORNING
Qty: 30 CAPSULE | Refills: 0 | Status: SHIPPED | OUTPATIENT
Start: 2025-05-29

## 2025-04-29 RX ORDER — LISDEXAMFETAMINE DIMESYLATE 60 MG/1
60 CAPSULE ORAL EVERY MORNING
Qty: 30 CAPSULE | Refills: 0 | Status: SHIPPED | OUTPATIENT
Start: 2025-04-29

## 2025-04-29 ASSESSMENT — PATIENT HEALTH QUESTIONNAIRE - PHQ9: SUM OF ALL RESPONSES TO PHQ QUESTIONS 1-9: 12

## 2025-04-29 NOTE — PROGRESS NOTES
Virtual Visit Details    Type of service:  Video Visit     Originating Location (pt. Location): Home    Distant Location (provider location):  Off-site  Platform used for Video Visit: [facility]  PSYCHIATRY CLINIC PROGRESS NOTE    30 minute medication management   IDENTIFICATION: Jerica Fontana is a 19 year old female with previous psychiatric diagnoses of major depressive disorder, without prior episode, current, moderate, social anxiety disorder and ADHD, inattentive type. Pt presents for ongoing psychiatric follow-up and was seen for initial diagnostic evaluation on 8/22/2019.   SUBJECTIVE / INTERIM HISTORY     The pt was last seen in clinic 4/9/25 at which time plan was made to restart effexor. The patient reports good medication adherence. Since the last visit, she has taken her medication daily as prescribed. She denies any known side effects of the medication.    SYMPTOMS include a recent lowering of mood in which she felt more depressed and unmotivated. She states that she mostly just 'rotted in bed'. She thinks this has improved recently in the past day or two. She is up today and getting ready for class and a therapy appointment. She denies current SI/SIB/HI but does endorse some passive SI earlier, denying plan or intent. Focus continues to be worse than before even when taking vyvanse.    Current Substance Use- no change. Sober support- na     MEDICAL ROS          Reports A comprehensive review of systems was performed and is negative other than noted above..     PAST MEDICATION TRIALS    Concerta up to 72 mg first trialed 7/23/20-3/24/21 was effective, but caused dry eyes. Restarted 5/5/21, was effective for under a year at 72 mg but was switched to vyvanse on 2/7/2022 due to reduced efficacy.  Adderall XR started 3/24/21 and titrated up to 30 mg on 4/2/21. Switched back to Concerta on 5/5/21 as it was not as effective.  Lexapro started 8/22/19 and titrated to 20 mg on 12/23/19 and currently moderately  effective for anxiety, but minimally effective for depression, stopped by pt in January 2025   Wellbutrin  mg started 12/23/20 and currently moderately effective for depression with Lexapro 20 mg, attempted taper July 2023 but restarted due to increase in anxiety, stopped by pt in January 2025   Hydroxyzine 10 mg TID PRN for anxiety started 12/23/19 and currently not using often.  Effexor, retrial 4/9/25 well tolerated so far was previously stopped due to concerns stomach upset but this did not improve without the medication and was ultimately linked to anxiety, was originally helpful at 75 mg   MEDICAL HISTORY      Primary Care Physician: Clinic, [facility] at [address]  [place] [address]     Neurologic Hx:  head injury- none     seizure- none      LOC- none    other- na   Patient Active Problem List   Diagnosis    Distal radius fracture     ALLERGY     No Known Allergies    MEDICATIONS      Current Outpatient Medications   Medication Sig Dispense Refill    [START ON 5/29/2025] lisdexamfetamine (VYVANSE) 60 MG capsule Take 1 capsule (60 mg) by mouth every morning. 30 capsule 0    lisdexamfetamine (VYVANSE) 60 MG capsule Take 1 capsule (60 mg) by mouth every morning. 30 capsule 0    venlafaxine (EFFEXOR XR) 75 MG 24 hr capsule Take 1 capsule (75 mg) by mouth daily. 30 capsule 1    estradiol (VIVELLE-DOT) 0.025 MG/24HR bi-weekly patch Place 1 patch onto the skin twice a week      propranolol (INDERAL) 10 MG tablet Take 0.5-1 tablets (5-10 mg) by mouth daily as needed (anxiety). 30 tablet 0    VITAMIN D PO Take by mouth daily       No current facility-administered medications for this visit.       Drug Interaction Check is remarkable for:  Amphetamines may enhance the serotonergic effect of Serotonergic Agents (High Risk). This could result in serotonin syndrome.   Previously tolerated  VITALS    There were no vitals taken for this visit.  LABS  use PSYCHLAB______    "    none    MENTAL STATUS EXAM     Alertness: alert  and oriented  Appearance: casually groomed  Behavior/Demeanor: cooperative, with fair  eye contact  Speech: normal  Language: no problems  Psychomotor: restless and fidgety  Mood:  \"up and down\"  Affect: appropriate; was congruent to mood; was congruent to content  Thought Process/Associations: unremarkable  Thought Content: denies suicidal ideation   Perception: denies auditory hallucinations and visual hallucinations  Insight: fair  Judgment: fair  Cognition: does appear grossly intact; formal cognitive testing was not done    PSYCHOLOGICAL TESTING:     none    ASSESSMENT     Jerica Fontana is a 19 year old female with psychiatric diagnoses of major depressive disorder, recurrent, moderate, social anxiety disorder and ADHD, inattentive type. Patient was engaged and cooperative throughout the visit. She has tolerated the retrial of effexor well. No significant improvement in mood yet but she has only been at the 75 mg dose for about 2 weeks. She continues to struggle with focus. Plan made to increase vyvanse to 60 mg and continue effexor 75 mg until next appointment in 2 weeks. [place] to reach out sooner with any questions, concerns, or if an earlier appointment is needed.       The longitudinal plan of care for major depressive disorder, without prior episode, current, moderate, social anxiety disorder and ADHD, inattentive type  were addressed during this visit. Due to the added complexity in care, I will continue to support Jerica in the subsequent management of this condition(s) and with the ongoing continuity of care of this condition(s).      6}       TREATMENT RISK STATEMENT:  The risks, benefits, alternatives and potential adverse effects have been explained and are understood by the pt and pt's parent(s)/guardian.  Discussion of specific concerns included- N/A. The  pt and pt's parent(s)/guardian agrees to the treatment plan with the ability to do so. The "  pt and pt's parent(s)/guardian knows to call the clinic for any problems or access emergency care if needed. There are no medical considerations relevant to treatment, as noted above. Substance use is not a problem as noted above.      Drug interaction check was done for any med changes and is discussed above.      DIAGNOSES                                                                                                      Encounter Diagnoses   Name Primary?    ADHD (attention deficit hyperactivity disorder), inattentive type     Moderate episode of recurrent major depressive disorder (H)     Social anxiety disorder                                    PLAN                                                                                                 Medication Plan:         -- increase vyvanse to 60 mg PO Q Day  sent        -- continue effexor XR 75 mg Po Q Day   Sent       -- continue propranolol 5-10 mg PO Q Day PRN                 Not requested today    Labs:  none    Pt monitor [call for probs]: nothing specific needed    THERAPY: No Change    REFERRALS [CD, medical, other]:  none    :  none    Controlled Substance Contract was not completed    RTC: 2 weeks    CRISIS NUMBERS: Provided in AVS upon request of patient/guardian.

## 2025-05-29 ENCOUNTER — VIRTUAL VISIT (OUTPATIENT)
Dept: PSYCHIATRY | Facility: CLINIC | Age: 20
End: 2025-05-29
Payer: COMMERCIAL

## 2025-05-29 DIAGNOSIS — F40.10 SOCIAL ANXIETY DISORDER: ICD-10-CM

## 2025-05-29 DIAGNOSIS — F33.1 MODERATE EPISODE OF RECURRENT MAJOR DEPRESSIVE DISORDER (H): ICD-10-CM

## 2025-05-29 DIAGNOSIS — F90.0 ADHD (ATTENTION DEFICIT HYPERACTIVITY DISORDER), INATTENTIVE TYPE: ICD-10-CM

## 2025-05-29 RX ORDER — VENLAFAXINE HYDROCHLORIDE 150 MG/1
150 CAPSULE, EXTENDED RELEASE ORAL DAILY
Qty: 30 CAPSULE | Refills: 1 | Status: SHIPPED | OUTPATIENT
Start: 2025-05-29

## 2025-05-29 RX ORDER — LISDEXAMFETAMINE DIMESYLATE 60 MG/1
60 CAPSULE ORAL EVERY MORNING
Qty: 30 CAPSULE | Refills: 0 | Status: SHIPPED | OUTPATIENT
Start: 2025-06-03

## 2025-05-29 RX ORDER — PROPRANOLOL HYDROCHLORIDE 10 MG/1
TABLET ORAL
Qty: 90 TABLET | OUTPATIENT
Start: 2025-05-29

## 2025-05-29 RX ORDER — VENLAFAXINE HYDROCHLORIDE 75 MG/1
75 CAPSULE, EXTENDED RELEASE ORAL DAILY
Qty: 90 CAPSULE | Refills: 1 | OUTPATIENT
Start: 2025-05-29

## 2025-05-29 RX ORDER — PROPRANOLOL HYDROCHLORIDE 10 MG/1
5-10 TABLET ORAL DAILY PRN
Qty: 30 TABLET | Refills: 0 | Status: SHIPPED | OUTPATIENT
Start: 2025-05-29

## 2025-05-29 ASSESSMENT — PAIN SCALES - GENERAL: PAINLEVEL_OUTOF10: NO PAIN (0)

## 2025-05-29 NOTE — NURSING NOTE
Current patient location: [address] [place] [id number]    Is the patient currently in the state of [place]? YES    Visit mode: VIDEO    If the visit is dropped, the patient can be reconnected by:VIDEO VISIT: Text to cell phone:   Telephone Information:   Mobile [phone]       Will anyone else be joining the visit? NO  (If patient encounters technical issues they should call [phone]    Are changes needed to the allergy or medication list? No    Are refills needed on medications prescribed by this physician? NO    Rooming Documentation:  Questionnaire(s) completed    Reason for visit: RECHECK    Aylin LOPEZ

## 2025-05-29 NOTE — TELEPHONE ENCOUNTER
Request for 90 day supply DENIED. Not required by insurance and patient has new prescription available.

## 2025-05-29 NOTE — TELEPHONE ENCOUNTER
Request for 90 day supply DENIED. Patient has medication available and has an appointment with provider today.

## 2025-05-29 NOTE — PROGRESS NOTES
Virtual Visit Details    Type of service:  Video Visit     Originating Location (pt. Location): Home    Distant Location (provider location):  Off-site  Platform used for Video Visit: [facility]  PSYCHIATRY CLINIC PROGRESS NOTE    30 minute medication management   IDENTIFICATION: Jerica Fontana is a 20 year old female with previous psychiatric diagnoses of major depressive disorder, without prior episode, current, moderate, social anxiety disorder and ADHD, inattentive type. Pt presents for ongoing psychiatric follow-up and was seen for initial diagnostic evaluation on 8/22/2019.   SUBJECTIVE / INTERIM HISTORY     The pt was last seen in clinic 4/29/25 at which time vyvanse was increased to 60 mg daily. The patient reports good medication adherence. Since the last visit, she has taken her medication most days as prescribed. She does notice that sleep initiation is a little worse and thinks maybe vyvanse is contributing.    SYMPTOMS include a recent lowering of mood that then resulted in not getting her work done, failing classes, and moving back home. She states that this has been going on for a while and her Mom now wants her to do an IOP or PHP program. She thinks social anxiety is ramped up significantly as well and she is feeling pretty down about not finishing her classes and what people might think of her. Sleep is disrupted and she is falling asleep later then sleeping most of the day. She denies current SI/HI/SIB or any other known safety concerns.     Current Substance Use- no change. Sober support- na     MEDICAL ROS          Reports A comprehensive review of systems was performed and is negative other than noted above.    PAST MEDICATION TRIALS    Concerta up to 72 mg first trialed 7/23/20-3/24/21 was effective, but caused dry eyes. Restarted 5/5/21, was effective for under a year at 72 mg but was switched to vyvanse on 2/7/2022 due to reduced efficacy.  Adderall XR started 3/24/21 and titrated up to 30 mg  on 4/2/21. Switched back to Concerta on 5/5/21 as it was not as effective.  Lexapro started 8/22/19 and titrated to 20 mg on 12/23/19 and currently moderately effective for anxiety, but minimally effective for depression, stopped by pt in January 2025   Wellbutrin  mg started 12/23/20 and currently moderately effective for depression with Lexapro 20 mg, attempted taper July 2023 but restarted due to increase in anxiety, stopped by pt in January 2025   Hydroxyzine 10 mg TID PRN for anxiety started 12/23/19 and currently not using often.  Effexor, retrial 4/9/25 well tolerated so far was previously stopped due to concerns stomach upset but this did not improve without the medication and was ultimately linked to anxiety, was originally helpful at 75 mg   MEDICAL HISTORY      Primary Care Physician: Clinic, [facility] at [address]  [facility] [place] [facility] [address]     Neurologic Hx:  head injury- none     seizure- none      LOC- none    other- na   Patient Active Problem List   Diagnosis    Distal radius fracture     ALLERGY     No Known Allergies    MEDICATIONS      Current Outpatient Medications   Medication Sig Dispense Refill    estradiol (VIVELLE-DOT) 0.025 MG/24HR bi-weekly patch Place 1 patch onto the skin twice a week      [START ON 6/3/2025] lisdexamfetamine (VYVANSE) 60 MG capsule Take 1 capsule (60 mg) by mouth every morning. 30 capsule 0    lisdexamfetamine (VYVANSE) 60 MG capsule Take 1 capsule (60 mg) by mouth every morning. 30 capsule 0    propranolol (INDERAL) 10 MG tablet Take 0.5-1 tablets (5-10 mg) by mouth daily as needed (anxiety). 30 tablet 0    venlafaxine (EFFEXOR XR) 150 MG 24 hr capsule Take 1 capsule (150 mg) by mouth daily. 30 capsule 1    VITAMIN D PO Take by mouth daily       No current facility-administered medications for this visit.       Drug Interaction Check is remarkable for:  Amphetamines may enhance the serotonergic effect of Serotonergic Agents (High  Risk). This could result in serotonin syndrome.   Previously tolerated  VITALS    There were no vitals taken for this visit.  LABS  use PSYCHLAB______       none    MENTAL STATUS EXAM     Alertness: alert  and oriented  Appearance: casually groomed  Behavior/Demeanor: cooperative, with fair  eye contact  Speech: normal  Language: no problems  Psychomotor: restless and fidgety  Mood:  depressed  Affect: appropriate; was congruent to mood; was congruent to content  Thought Process/Associations: unremarkable  Thought Content: denies suicidal ideation   Perception: denies auditory hallucinations and visual hallucinations  Insight: fair  Judgment: fair  Cognition: does appear grossly intact; formal cognitive testing was not done    PSYCHOLOGICAL TESTING:     none    ASSESSMENT     Jerica Fontana is a 20 year old female with psychiatric diagnoses of major depressive disorder, recurrent, moderate, social anxiety disorder and ADHD, inattentive type. Patient was engaged and cooperative throughout the visit though tearful at times. She thinks she has become progressively more depressed which resulted in failing school. She has moved back home and feels an increased level of care is needed. Mom was present at the end of the appointment as well and will help Jerica coordinate care. Plan made to increase effexor today and will place referrals for IOP/PHP. Follow-up with me planned for 1 month if Jerica is not already enrolled in programming.         The longitudinal plan of care for major depressive disorder, without prior episode, current, moderate, social anxiety disorder and ADHD, inattentive type  were addressed during this visit. Due to the added complexity in care, I will continue to support Jerica in the subsequent management of this condition(s) and with the ongoing continuity of care of this condition(s).      6}       TREATMENT RISK STATEMENT:  The risks, benefits, alternatives and potential adverse effects have been  explained and are understood by the pt and pt's parent(s)/guardian.  Discussion of specific concerns included- N/A. The  pt and pt's parent(s)/guardian agrees to the treatment plan with the ability to do so. The  pt and pt's parent(s)/guardian knows to call the clinic for any problems or access emergency care if needed. There are no medical considerations relevant to treatment, as noted above. Substance use is not a problem as noted above.      Drug interaction check was done for any med changes and is discussed above.      DIAGNOSES                                                                                                      Encounter Diagnoses   Name Primary?    Moderate episode of recurrent major depressive disorder (H)     Social anxiety disorder     ADHD (attention deficit hyperactivity disorder), inattentive type                                    PLAN                                                                                                 Medication Plan:         -- increase effexor XR to 150 mg PO Q Day  sent        -- continue vyvanse 60 mg PO Q Day   Sent       -- continue propranolol 5-10 mg PO Q Day PRN    sent    Labs:  none    Pt monitor [call for probs]: nothing specific needed    THERAPY: re-engage    REFERRALS [CD, medical, other]:  PHP    :  none    Controlled Substance Contract was not completed    RTC: 1 month if not in PHP    CRISIS NUMBERS: Provided in AVS upon request of patient/guardian.

## 2025-06-29 NOTE — PROGRESS NOTES
"Virtual Visit Details    Type of service:  Video Visit     Originating Location (pt. Location): Home    Distant Location (provider location):  Off-site  Platform used for Video Visit: [facility]      PSYCHIATRY CLINIC PROGRESS NOTE    30 minute medication management   IDENTIFICATION: Jerica Fontana is a 20 year old female with previous psychiatric diagnoses of major depressive disorder, without prior episode, current, moderate, social anxiety disorder and ADHD, inattentive type. Pt presents for ongoing psychiatric follow-up and was seen for initial diagnostic evaluation on 8/22/2019.   SUBJECTIVE / INTERIM HISTORY     The pt was last seen in clinic 5/29/2025 at which time effexor XR was increased to 150 mg daily. The patient reports good medication adherence. Since the last visit, she has taken her medication as prescribed and has not had any side effects from the increase. She is trying to get re-established with therapy but has not received a call back.     SYMPTOMS include some ups and down in mood and energy. She continues to feel anxious when facing social situations. She has been working hard on \"self development\" and trying to understand how to manage her emotions and why she feels the way she does. This has been rewarding but exhausting and she is struggling to do this work on her own. She denies current SI/HI/SIB or any other known safety concerns. She is working on quitting nicotine and it is going fairly well.   Current Substance Use- reduced nicotine use. Sober support- na     MEDICAL ROS          Reports A comprehensive review of systems was performed and is negative other than noted above.    PAST MEDICATION TRIALS    Concerta up to 72 mg first trialed 7/23/20-3/24/21 was effective, but caused dry eyes. Restarted 5/5/21, was effective for under a year at 72 mg but was switched to vyvanse on 2/7/2022 due to reduced efficacy.  Adderall XR started 3/24/21 and titrated up to 30 mg on 4/2/21. Switched back to " Concerta on 5/5/21 as it was not as effective.  Lexapro started 8/22/19 and titrated to 20 mg on 12/23/19 and currently moderately effective for anxiety, but minimally effective for depression, stopped by pt in January 2025   Wellbutrin  mg started 12/23/20 and currently moderately effective for depression with Lexapro 20 mg, attempted taper July 2023 but restarted due to increase in anxiety, stopped by pt in January 2025   Hydroxyzine 10 mg TID PRN for anxiety started 12/23/19 and currently not using often.  Effexor, retrial 4/9/25 well tolerated so far was previously stopped due to concerns stomach upset but this did not improve without the medication and was ultimately linked to anxiety, was originally helpful at 75 mg  MEDICAL HISTORY      Primary Care Physician: Clinic, [facility] at [address]  [facility] [address]     Neurologic Hx:  head injury- none     seizure- none      LOC- none    other- na   Patient Active Problem List   Diagnosis    Distal radius fracture     ALLERGY     No Known Allergies    MEDICATIONS      Current Outpatient Medications   Medication Sig Dispense Refill    estradiol (VIVELLE-DOT) 0.025 MG/24HR bi-weekly patch Place 1 patch onto the skin twice a week      lisdexamfetamine (VYVANSE) 60 MG capsule Take 1 capsule (60 mg) by mouth every morning. 30 capsule 0    lisdexamfetamine (VYVANSE) 60 MG capsule Take 1 capsule (60 mg) by mouth every morning. 30 capsule 0    propranolol (INDERAL) 10 MG tablet Take 0.5-1 tablets (5-10 mg) by mouth daily as needed (anxiety). 30 tablet 0    venlafaxine (EFFEXOR XR) 150 MG 24 hr capsule Take 1 capsule (150 mg) by mouth daily. 30 capsule 1    VITAMIN D PO Take by mouth daily       No current facility-administered medications for this visit.       Drug Interaction Check is remarkable for:  Amphetamines may enhance the serotonergic effect of Serotonergic Agents (High Risk). This could result in serotonin syndrome.  "  Previously tolerated  VITALS    There were no vitals taken for this visit.  LABS  use PSYCHLAB______       none    MENTAL STATUS EXAM     Alertness: alert  and oriented  Appearance: casually groomed  Behavior/Demeanor: cooperative, with fair  eye contact  Speech: normal  Language: no problems  Psychomotor: restless and fidgety  Mood:  \"back and forth; john\"  Affect: appropriate; was congruent to mood; was congruent to content  Thought Process/Associations: unremarkable  Thought Content: denies suicidal ideation   Perception: denies auditory hallucinations and visual hallucinations  Insight: fair  Judgment: fair  Cognition: does appear grossly intact; formal cognitive testing was not done    PSYCHOLOGICAL TESTING:     none    ASSESSMENT     Jerica Fontana is a 20 year old female with psychiatric diagnoses of major depressive disorder, recurrent, moderate, social anxiety disorder and ADHD, inattentive type. Patient was engaged and cooperative throughout the visit. She has been doing a lot of self-work on identifying and regulating emotions but is feeling exhausted from this. She has had some mood swings and continues to feel anxious. She will start taking propanolol prior to situations she knows will make her anxious. She would also like to try another increase of effexor. Plan to increase effexor to 225 mg daily and Jerica to check blood pressure at home. New therapy referrals placed to [facility]. Follow up planned for 1 month. Jerica to reach out sooner with any questions, concerns, or if an earlier appointment is needed.          I was present with the student Francheska Pink, who participated in the service and in the documentation of the note. I have verified the history and personally performed the psychiatric exam and medical decision-making. I agree with the assessment and plan of care as documented in the note.     The longitudinal plan of care for major depressive disorder, without prior episode, current, " moderate, social anxiety disorder and ADHD, inattentive type were addressed during this visit. Due to the added complexity in care, I will continue to support Jerica in the subsequent management of this condition(s) and with the ongoing continuity of care of this condition(s).      6}       TREATMENT RISK STATEMENT:  The risks, benefits, alternatives and potential adverse effects have been explained and are understood by the pt and pt's parent(s)/guardian.  Discussion of specific concerns included- N/A. The  pt and pt's parent(s)/guardian agrees to the treatment plan with the ability to do so. The  pt and pt's parent(s)/guardian knows to call the clinic for any problems or access emergency care if needed. There are no medical considerations relevant to treatment, as noted above. Substance use is not a problem as noted above.      Drug interaction check was done for any med changes and is discussed above.      DIAGNOSES                                                                                                      Encounter Diagnoses   Name Primary?    Moderate episode of recurrent major depressive disorder (H) Yes    ADHD (attention deficit hyperactivity disorder), inattentive type     Social anxiety disorder                                  PLAN                                                                                                 Medication Plan:         -- increase effexor XR to 225 mg PO Q Day  sent        -- continue vyvanse 60 mg PO Q Day                 Sent       -- continue propranolol 5-10 mg PO Q Day PRN                  Not sent, pt to message if she needs a refill    Labs:  none    Pt monitor [call for probs]: to get home blood pressure     THERAPY: awaiting call back from previous referral    REFERRALS [CD, medical, other]:  [facility] therapy referral placed    :  none    Controlled Substance Contract was not completed    RTC: 1 month    CRISIS NUMBERS: Provided in AVS upon  request of patient/guardian.

## 2025-06-30 ENCOUNTER — VIRTUAL VISIT (OUTPATIENT)
Dept: PSYCHIATRY | Facility: CLINIC | Age: 20
End: 2025-06-30
Payer: COMMERCIAL

## 2025-06-30 DIAGNOSIS — F40.10 SOCIAL ANXIETY DISORDER: ICD-10-CM

## 2025-06-30 DIAGNOSIS — F33.1 MODERATE EPISODE OF RECURRENT MAJOR DEPRESSIVE DISORDER (H): Primary | ICD-10-CM

## 2025-06-30 DIAGNOSIS — F90.0 ADHD (ATTENTION DEFICIT HYPERACTIVITY DISORDER), INATTENTIVE TYPE: ICD-10-CM

## 2025-06-30 PROCEDURE — 1126F AMNT PAIN NOTED NONE PRSNT: CPT | Mod: 95 | Performed by: NURSE PRACTITIONER

## 2025-06-30 PROCEDURE — 98006 SYNCH AUDIO-VIDEO EST MOD 30: CPT | Performed by: NURSE PRACTITIONER

## 2025-06-30 PROCEDURE — G2211 COMPLEX E/M VISIT ADD ON: HCPCS | Mod: 95 | Performed by: NURSE PRACTITIONER

## 2025-06-30 RX ORDER — LISDEXAMFETAMINE DIMESYLATE 60 MG/1
60 CAPSULE ORAL EVERY MORNING
Qty: 30 CAPSULE | Refills: 0 | Status: SHIPPED | OUTPATIENT
Start: 2025-06-30

## 2025-06-30 RX ORDER — VENLAFAXINE HYDROCHLORIDE 75 MG/1
75 CAPSULE, EXTENDED RELEASE ORAL DAILY
Qty: 30 CAPSULE | Refills: 1 | Status: SHIPPED | OUTPATIENT
Start: 2025-06-30

## 2025-06-30 RX ORDER — LISDEXAMFETAMINE DIMESYLATE 60 MG/1
60 CAPSULE ORAL EVERY MORNING
Qty: 30 CAPSULE | Refills: 0 | Status: SHIPPED | OUTPATIENT
Start: 2025-07-30

## 2025-06-30 RX ORDER — VENLAFAXINE HYDROCHLORIDE 150 MG/1
150 CAPSULE, EXTENDED RELEASE ORAL DAILY
Qty: 30 CAPSULE | Refills: 1 | Status: SHIPPED | OUTPATIENT
Start: 2025-06-30

## 2025-06-30 ASSESSMENT — PAIN SCALES - GENERAL: PAINLEVEL_OUTOF10: NO PAIN (0)

## 2025-06-30 NOTE — NURSING NOTE
Current patient location: [address] [place] [id number]    Is the patient currently in the state of [place]? YES    Visit mode: VIDEO    If the visit is dropped, the patient can be reconnected by:VIDEO VISIT: Text to cell phone:   Telephone Information:   Mobile [phone]       Will anyone else be joining the visit? NO  (If patient encounters technical issues they should call [phone]    Are changes needed to the allergy or medication list? Yes please remove duplicate from med list:  -Lisdexamfetamine (VYVANSE) 60 MG capsule   Are refills needed on medications prescribed by this physician? NO    Rooming Documentation:  Questionnaire(s) not pre-assigned    Reason for visit: CHRISS LOPEZ

## 2025-07-03 DIAGNOSIS — F40.10 SOCIAL ANXIETY DISORDER: ICD-10-CM

## 2025-07-03 RX ORDER — PROPRANOLOL HYDROCHLORIDE 10 MG/1
TABLET ORAL
Qty: 90 TABLET | OUTPATIENT
Start: 2025-07-03

## 2025-07-03 RX ORDER — PROPRANOLOL HYDROCHLORIDE 10 MG/1
5-10 TABLET ORAL DAILY PRN
Qty: 30 TABLET | Refills: 1 | Status: SHIPPED | OUTPATIENT
Start: 2025-07-03

## 2025-07-03 NOTE — TELEPHONE ENCOUNTER
Last seen: 6/30  RTC: 1 month   Cancel: none  No-show: none  Next appointment: 8/7    propranolol (INDERAL) 10 MG tablet 30 tablet 0 5/29/2025 -- No   Sig - Route: Take 0.5-1 tablets (5-10 mg) by mouth daily as needed (anxiety). - Oral   Last refilled: 5/29 for 30 d/s     Per most recent visit note:       -- continue propranolol 5-10 mg PO Q Day PRN                  Not sent, pt to message if she needs a refill    Medication refill approved per refill protocol  
Adequate: hears normal conversation without difficulty

## 2025-07-12 ENCOUNTER — HEALTH MAINTENANCE LETTER (OUTPATIENT)
Age: 20
End: 2025-07-12

## 2025-09-02 DIAGNOSIS — F40.10 SOCIAL ANXIETY DISORDER: ICD-10-CM

## 2025-09-03 RX ORDER — PROPRANOLOL HYDROCHLORIDE 10 MG/1
TABLET ORAL
Qty: 90 TABLET | OUTPATIENT
Start: 2025-09-03